# Patient Record
Sex: MALE | Race: BLACK OR AFRICAN AMERICAN | Employment: OTHER | URBAN - METROPOLITAN AREA
[De-identification: names, ages, dates, MRNs, and addresses within clinical notes are randomized per-mention and may not be internally consistent; named-entity substitution may affect disease eponyms.]

---

## 2022-10-05 PROBLEM — N18.4 STAGE 4 CHRONIC KIDNEY DISEASE (HCC): Status: ACTIVE | Noted: 2022-06-08

## 2022-10-05 PROBLEM — I24.9 ACUTE CORONARY SYNDROME (HCC): Status: ACTIVE | Noted: 2022-06-08

## 2022-10-05 PROBLEM — F12.10 MARIJUANA ABUSE: Status: ACTIVE | Noted: 2022-06-08

## 2022-10-05 PROBLEM — I63.312 CEREBROVASCULAR ACCIDENT (CVA) DUE TO THROMBOSIS OF LEFT MIDDLE CEREBRAL ARTERY (HCC): Status: ACTIVE | Noted: 2021-05-05

## 2022-10-05 PROBLEM — I21.4 NSTEMI (NON-ST ELEVATED MYOCARDIAL INFARCTION) (HCC): Status: ACTIVE | Noted: 2022-06-09

## 2022-10-05 PROBLEM — I16.0 HYPERTENSIVE URGENCY: Status: ACTIVE | Noted: 2022-06-08

## 2022-10-11 ENCOUNTER — HOSPITAL ENCOUNTER (OUTPATIENT)
Dept: CARDIAC REHAB | Age: 47
Discharge: HOME | End: 2022-10-11
Payer: MEDICAID

## 2022-10-11 VITALS — WEIGHT: 167 LBS

## 2022-10-11 PROCEDURE — 93798 PHYS/QHP OP CAR RHAB W/ECG: CPT

## 2022-10-11 RX ORDER — NIFEDIPINE 90 MG/1
90 TABLET, EXTENDED RELEASE ORAL DAILY
COMMUNITY

## 2022-10-11 RX ORDER — HYDRALAZINE HYDROCHLORIDE 100 MG/1
100 TABLET, FILM COATED ORAL 3 TIMES DAILY
COMMUNITY

## 2022-10-11 RX ORDER — SPIRONOLACTONE 25 MG/1
25 TABLET ORAL DAILY
COMMUNITY

## 2022-10-11 RX ORDER — CALCITRIOL 0.25 UG/1
0.25 CAPSULE ORAL DAILY
COMMUNITY

## 2022-10-11 RX ORDER — ASPIRIN 81 MG/1
81 TABLET ORAL
COMMUNITY

## 2022-10-11 RX ORDER — CLOPIDOGREL BISULFATE 75 MG/1
75 TABLET ORAL DAILY
COMMUNITY

## 2022-10-11 RX ORDER — CLONIDINE HYDROCHLORIDE 0.3 MG/1
0.3 TABLET ORAL 3 TIMES DAILY
COMMUNITY

## 2022-10-11 RX ORDER — METOPROLOL SUCCINATE 100 MG/1
100 TABLET, EXTENDED RELEASE ORAL DAILY
COMMUNITY

## 2022-10-11 RX ORDER — ATORVASTATIN CALCIUM 80 MG/1
80 TABLET, FILM COATED ORAL DAILY
COMMUNITY

## 2022-10-11 NOTE — PROGRESS NOTES
CARDIAC REHAB INITIAL ITP FOR REVIEW AND SIGNATURE    Christy Cerna 49 y.o. presented to cardiac rehab for an intake and a six minute walk test today with a primary diagnosis of NSTEMI/PCI. Patient's EF is 70%. Christy Cerna has a history of Hypertension, Coronary artery disease, and history of prior Stroke, CRF. Cardiac risk factors include hypertension and these were reviewed with patient and spouse. Christy Cerna is  and lives with lives with their spouse. PHQ-9, depression score, is 10 and this is considered to be high. The result was discussed with patient who confirms score to be accurate and if above a 5, a copy of the results will be sent to patients pcp when able to contact. Patient denied chest pain or SOB during 6 minute walk and the cardiac rhythm was in Normal Sinus Rhythm no ectopy. Christy Cerna will attend exercise and educational sessions 2-3 days a week in cardiac rehab for 36 sessions. Exceptions noted during intake include - none noted. Goals for Rehab:    Patient name: Christy Cerna : 1975         Goals Comments   1. CONTROL HTN <130/85  Before next asessment/ITP [x] initial  [] met                  [] not met  [] progressing  BP high at 160/90 before during and after exercise. 2. Obtain cardiologist referral for local Cardiologist by 30days   [x] initial  [] met                  [] not met  [] progressing Discuss with pt and dr Lucia Alston   3. Increase endurance, maintain 70% EF   [x] initial  [] met                  [] not met  [] progressing    4.  Obtain labs and control lipids   [x] initial  [] met                  [] not met  [] progressing No lipids noted nor HGBA1C in chart     Ina Mcgill RN 10/11/2022 12:12 PM     Cardiac ITP    Flowsheet Row CR INTAKE from 10/11/2022 in [facility]   Treatment Diagnosis    Treatment Diagnosis 1 NSTEMI   NSTEMI Date 22   Treatment Diagnosis 2 PCI   PCI Date 22   Referral Date 10/07/22 Co-morbidities CVA   Individual Treatment Plan    ITP Visit Type Initial Assessment   1st Date of Exercise  10/11/22   ITP Next Review Date 11/09/22   Visit #/Total Visits 1/36   EF % 70 %   Risk Stratification Moderate   ITP Exercise, Tobacco, Nutrition, Education, Psychosocial   Exercise     Stages of Change Maintenance   DASI Total Score 39.45   Assisted Devices None   Total Score 1   Test Six minute walk test   Exercise Prescription    Mode Treadmill, Bike, Stepper, Ergometer   Frequency per week 2-3   Duration per session 31-55   RPE 11-13   Target Heart Rate 103-120   Resistance Training Yes   Exercise Blood Pressures    Resting /90   Peak /90   Is BP WDL?  No   Exercise Activity at Home    Exercise Education    Education Self pulse, Exercise safety, Signs/Symptoms to report, RPE scale, Equipment orientation, Warm up/Cool down   Exercise Target Goal    Target Goal(s) BP < 140/90 or < 130/80, if DM or CKD, Individual exercise RX   Patient Stated Exercise Goals maintain heart health   Psychosocial    Stages of Change Action   [facility] Total Score 20   PHQ 9 Score 10   Psychosocial Intervention    Interventions PCP notified   Psychosocial Education    Psychosocial Target Goals    Target Goal(s) Maximizes coping skills, Engages in self-care behaviors   Nutrition    Stages of Change Preparation   Lipids    Date Lipids Drawn --  [waiting for records if applicable]   Lipid Med(s) Atorvastatin   Weight Management    Weight  75.8 kg (167 lb)   Height  5' 7\" (1.702 m)   BMI 26.21   Weight Goal no change   Waist Circumference  37.5   Alcohol None   Nutrition Assessment Tool rate your plate   Rate Your Plate Total Score 57   Nutrition Intervention    Nutrition Education    Nutrition Target Goals Heart healthy diet   Target Goals LDL-C less than 70 for high risk, BMI less than 25   Education    Learning Barrier Ready to learn   Education Intervention    Education Schedule Given Yes   Patient Education Education CAD, Risk factors, Med Compliance, Cardiac A&P, Signs/Symptoms of Angina   Hypertension Yes   Hypertension Controlled No   Is BP WDL?  No   Med(s) Change --  [new patient]   BP Meds PROCARDIA, CLONODINE, HYDRALAZINE   Education Target Goals    Target Goals Medication compliance, Risk factors, Understand target guidelines for lipids, Understand target guidelines for B/P   Physician Response      Exercise    Flowsheet Row CR INTAKE from 10/11/2022 in [facility] Common Questions    Any problems changes since your last visit Other (comment)  [new patient]   Any symptoms while exercising Denies   Psychosocial/Stress Level 0   Resting EKG rhythm SB   Tobacco Use Quit (add date in comments)  [6/2011]   Enter O2 Saturation and Liter Flow 98/RA   ITP Next Review Date 11/09/22   Visit Number/Total Visits 1/36   What is plan for next session Exercise 31-55   On Call Medical Director Immediately Available [facility]   Exercise Treatment Log    Target Heart Rate(Range) 103-120   Resting HR 56   Resting /88   Recovery HR 58   Recovery /90   Weight 75.8 kg (167 lb)   Exercise EKG Rhythm SB   Exercise Duration 6 min   Peak HR 68   Peak /90   Peak RPE 6   Peak Mets 2.1   SOB Denies   Angina Denies   Claudulation Denies   Asymptomatic Yes   O2 Saturation 99   Total Minutes 16

## 2022-10-12 ENCOUNTER — HOSPITAL ENCOUNTER (OUTPATIENT)
Dept: CARDIAC REHAB | Age: 47
Discharge: HOME | End: 2022-10-12
Payer: MEDICAID

## 2022-10-12 VITALS — WEIGHT: 167 LBS

## 2022-10-12 PROCEDURE — 93798 PHYS/QHP OP CAR RHAB W/ECG: CPT

## 2022-10-17 ENCOUNTER — APPOINTMENT (OUTPATIENT)
Dept: CARDIAC REHAB | Age: 47
End: 2022-10-17
Payer: MEDICAID

## 2022-10-19 ENCOUNTER — APPOINTMENT (OUTPATIENT)
Dept: CARDIAC REHAB | Age: 47
End: 2022-10-19
Payer: MEDICAID

## 2022-10-21 ENCOUNTER — HOSPITAL ENCOUNTER (OUTPATIENT)
Dept: CARDIAC REHAB | Age: 47
Discharge: HOME | End: 2022-10-21
Payer: MEDICAID

## 2022-10-21 VITALS — WEIGHT: 166 LBS

## 2022-10-21 PROCEDURE — 93798 PHYS/QHP OP CAR RHAB W/ECG: CPT

## 2022-10-24 ENCOUNTER — APPOINTMENT (OUTPATIENT)
Dept: CARDIAC REHAB | Age: 47
End: 2022-10-24
Payer: MEDICAID

## 2022-10-26 ENCOUNTER — APPOINTMENT (OUTPATIENT)
Dept: CARDIAC REHAB | Age: 47
End: 2022-10-26
Payer: MEDICAID

## 2022-10-31 ENCOUNTER — APPOINTMENT (OUTPATIENT)
Dept: CARDIAC REHAB | Age: 47
End: 2022-10-31
Payer: MEDICAID

## 2022-11-02 ENCOUNTER — APPOINTMENT (OUTPATIENT)
Dept: CARDIAC REHAB | Age: 47
End: 2022-11-02

## 2022-11-07 ENCOUNTER — APPOINTMENT (OUTPATIENT)
Dept: CARDIAC REHAB | Age: 47
End: 2022-11-07

## 2022-11-09 ENCOUNTER — APPOINTMENT (OUTPATIENT)
Dept: CARDIAC REHAB | Age: 47
End: 2022-11-09

## 2022-11-10 NOTE — PROGRESS NOTES
CARDIAC REHAB ITP REASSESSMENT FOR REVIEW AND SIGNATURE  Patient name: Cornell Balderas : 1975     Visits from Start of Care: 3/36      Reporting Period: 10/11/2022 to 11/10/2022    Subjective Reports: Pt is progressing well, no complaints. Goals Comments   1. Increase stamina and endurance by the end of next recert. [] met                  [] not met  [x] progressing  Pt will increase cardiac output to maximize cardiac perfusion. 2. Exercise within Target [place] Rate range of 103-120 by the end of next recert. [] met                  [] not met  [x] progressing Pt will exercise within [facility] for optimum cardiac perfusion. 3. Manage blood pressure by the end of next recert. [] met                  [] not met  [x] progressing Pt will manage blood pressure through heart healthy diet and prescribed medications. 4. Watch nutritional video by the end or next recert. [] met                  [] not met  [x] progressing Pt will apply knowledge from video to diet. Key functional changes: Pt increasing strength. Problems/ barriers to goal attainment: None     Assessment / Recommendations:Continue w/ rehab.       11/10/22 1149   Treatment Diagnosis   Treatment Diagnosis 1 NSTEMI   NSTEMI Date 22   Treatment Diagnosis 2 PCI   PCI Date 22   Referral Date 10/07/22   Co-morbidities Cerebrovascular disease   OXYGEN INTERVENTION   Oxygen Use No   Individual Treatment Plan   ITP Visit Type Re-Assessment   1st Date of Exercise  10/11/22   ITP Next Review Date 22   Visit #/Total Visits 3/36   EF % 70 %   Risk Stratification Moderate   ITP Exercise; Tobacco;Nutrition;Education;Psychosocial   Exercise    Stages of Change Action   Assisted Devices None   Exercise Prescription   Mode Treadmill;Bike;Stepper;Ergometer;Elliptical   Frequency per week 2-3   Duration per session 31-55   RPE 11-13   Symptoms with Exercise   (None)   Target Heart Rate 103-120   Resistance Training Yes   Exercise Blood Pressures   Resting /92   Peak /92   Is BP WDL? No   Exercise Education   Education Self pulse;Exercise safety;Signs/Symptoms to report;RPE scale;Equipment orientation; Warm up/Cool down   Exercise Target Goal   Target Goal(s) BP < 140/90 or < 130/80, if DM or CKD; Individual exercise RX   Patient Stated Exercise Goals maintain heart health   Psychosocial   Stages of Change Action   Psychosocial Intervention   Interventions PCP notified   Currently Taking Psychotropic Meds No   Psychosocial Target Goals   Target Goal(s) Maximizes coping skills;Engages in self-care behaviors   Nutrition   Stages of Change Action   Diabetes No   Lipids   Date Lipids Drawn   (no recent labs)   Lipid Med Change(s) No   Weight Management   Waist Circumference  37.5   Alcohol None   Nutrition Assessment Tool rate your plate   Weight  19.2 kg (166 lb)   Height  5' 7\" (1.702 m)   BMI 26.05   Nutrition Intervention   Dietitian Consult No   Nurse/Patient Discussion Yes   Nutrition Class No   Diabetes Education Referral No   Lipid Clinic Referral No   Weight Management Referral No   Nutrition Education   Education Low fat & cholesterol diet;Carb-controlled diet; Low sodium diet; Healthy eating;Special diet   Nutrition Target Goals   Target Goals LDL-C less than 70 for high risk;BMI less than 25   Education   Learning Barrier Ready to learn   Education Intervention   Education Schedule Given Yes   Patient Education    Education CAD;Risk factors;Med Compliance;Cardiac A&P;Signs/Symptoms of Angina   Hypertension Yes   Hypertension Controlled No   Is BP WDL? No   Med(s) Change No   BP Meds ASA, Lipitor, Rocaltrol, catapres, apressine, metoprolol, procardia, aldactone   ACE/ARB Prescribed Yes   ASA Prescribed Yes   BB Prescribed Yes   Statins Prescribed Yes   ACE/ARB Adherent Yes   ASA Adherent Yes   BB Adherent Yes   Statin Intensity High   Statins Adherent Yes   Education Target Goals   Target Goals Medication compliance; Risk factors; Understand target guidelines for lipids; Understand target guidelines for B/P   Treatment Goals   Goals Exercise; Functional capacity; Blood pressure;Psychosocial;Lipids;Nutrients   Exercise Goal Status Progressing   Exercise Goal Comments 19 SESSIOS AT PREVIOUS REHAB   Exercise Goal Assessment Treatment time frame;Frequency/duration;Problems/barriers          10/21/22 1442   Rehab Common Questions   Any problems changes since your last visit Denies   Any symptoms while exercising Denies   Psychosocial/Stress Level 0   Resting EKG rhythm SR/SB inverted t wave   Tobacco Use None   ITP Next Review Date 11/08/22   Visit Number/Total Visits 3/36   What is plan for next session Exercise   On Call Medical Director Immediately Available [facility]   Exercise Treatment Log   Target Heart Rate(Range) 103-120   Resting HR 64   Resting /92   Recovery HR 48   Recovery /84   Weight 75.3 kg (166 lb)   Exercise EKG Rhythm SR inverted t wave   Exercise Duration 45   Peak HR 66   Peak /92   Peak RPE 11   SOB 0   Angina 0   Claudulation 0   Asymptomatic Yes   Total Minutes 54       Carlos Staley RN 11/10/2022 11:35 AM

## 2022-11-14 ENCOUNTER — APPOINTMENT (OUTPATIENT)
Dept: CARDIAC REHAB | Age: 47
End: 2022-11-14

## 2022-11-16 ENCOUNTER — APPOINTMENT (OUTPATIENT)
Dept: CARDIAC REHAB | Age: 47
End: 2022-11-16

## 2022-11-21 ENCOUNTER — APPOINTMENT (OUTPATIENT)
Dept: CARDIAC REHAB | Age: 47
End: 2022-11-21

## 2022-11-23 ENCOUNTER — APPOINTMENT (OUTPATIENT)
Dept: CARDIAC REHAB | Age: 47
End: 2022-11-23

## 2022-11-28 ENCOUNTER — APPOINTMENT (OUTPATIENT)
Dept: CARDIAC REHAB | Age: 47
End: 2022-11-28

## 2022-11-30 ENCOUNTER — APPOINTMENT (OUTPATIENT)
Dept: CARDIAC REHAB | Age: 47
End: 2022-11-30

## 2022-12-05 ENCOUNTER — APPOINTMENT (OUTPATIENT)
Dept: CARDIAC REHAB | Age: 47
End: 2022-12-05

## 2022-12-05 NOTE — PROGRESS NOTES
Non Routine Cardiac Rehab Discharge    Myriam Key  49 y.o. With diagnosis of NSTEMI w/ stent attended phase II cardiac rehab for 3 sessions. No call/ No show. Tried reaching out to patient several times, no response. Both phones on file are out of order.    Kirstin Hargrove RN  12/5/2022

## 2022-12-07 ENCOUNTER — APPOINTMENT (OUTPATIENT)
Dept: CARDIAC REHAB | Age: 47
End: 2022-12-07